# Patient Record
Sex: MALE | Race: WHITE | NOT HISPANIC OR LATINO | Employment: OTHER | URBAN - NONMETROPOLITAN AREA
[De-identification: names, ages, dates, MRNs, and addresses within clinical notes are randomized per-mention and may not be internally consistent; named-entity substitution may affect disease eponyms.]

---

## 2023-08-30 PROBLEM — I25.10 ASCVD (ARTERIOSCLEROTIC CARDIOVASCULAR DISEASE): Status: ACTIVE | Noted: 2023-08-30

## 2023-08-30 PROBLEM — F41.9 ANXIETY DISORDER: Status: ACTIVE | Noted: 2023-08-30

## 2023-08-30 PROBLEM — E11.9 DIABETES MELLITUS (MULTI): Status: ACTIVE | Noted: 2023-08-30

## 2023-08-30 PROBLEM — E66.3 OVERWEIGHT WITH BODY MASS INDEX (BMI) OF 29 TO 29.9 IN ADULT: Status: ACTIVE | Noted: 2023-08-30

## 2023-08-30 PROBLEM — I10 ESSENTIAL HYPERTENSION: Status: ACTIVE | Noted: 2023-08-30

## 2023-08-30 PROBLEM — M05.20 RHEUMATOID ARTERITIS (MULTI): Status: ACTIVE | Noted: 2023-08-30

## 2023-08-30 PROBLEM — Z95.1 S/P CABG (CORONARY ARTERY BYPASS GRAFT): Status: ACTIVE | Noted: 2023-08-30

## 2023-08-30 PROBLEM — E78.5 HYPERLIPIDEMIA: Status: ACTIVE | Noted: 2023-08-30

## 2023-08-30 RX ORDER — FOLIC ACID 1 MG/1
1 TABLET ORAL DAILY
COMMUNITY

## 2023-08-30 RX ORDER — METFORMIN HYDROCHLORIDE 500 MG/1
500 TABLET ORAL
COMMUNITY

## 2023-08-30 RX ORDER — CHOLECALCIFEROL (VITAMIN D3) 125 MCG
2000 TABLET ORAL
COMMUNITY
End: 2023-10-06

## 2023-08-30 RX ORDER — ASPIRIN 81 MG/1
1 TABLET ORAL DAILY
COMMUNITY

## 2023-08-30 RX ORDER — PREDNISONE 5 MG/1
1 TABLET ORAL DAILY
COMMUNITY
End: 2023-10-06

## 2023-08-30 RX ORDER — METHOTREXATE 2.5 MG/1
8 TABLET ORAL
COMMUNITY

## 2023-08-30 RX ORDER — LANOLIN ALCOHOL/MO/W.PET/CERES
1 CREAM (GRAM) TOPICAL DAILY
COMMUNITY

## 2023-08-30 RX ORDER — BISOPROLOL FUMARATE 5 MG/1
1 TABLET, FILM COATED ORAL DAILY
COMMUNITY

## 2023-08-30 RX ORDER — LISINOPRIL 40 MG/1
1 TABLET ORAL DAILY
COMMUNITY

## 2023-08-30 RX ORDER — AMLODIPINE BESYLATE 10 MG/1
1 TABLET ORAL DAILY
COMMUNITY

## 2023-08-30 RX ORDER — NITROGLYCERIN 0.4 MG/1
TABLET SUBLINGUAL
COMMUNITY

## 2023-08-30 RX ORDER — EMPAGLIFLOZIN 10 MG/1
1 TABLET, FILM COATED ORAL DAILY
COMMUNITY

## 2023-08-30 RX ORDER — VENLAFAXINE 75 MG/1
1 TABLET ORAL 2 TIMES DAILY
COMMUNITY

## 2023-08-30 RX ORDER — ATORVASTATIN CALCIUM 40 MG/1
1 TABLET, FILM COATED ORAL DAILY
COMMUNITY

## 2023-10-06 ENCOUNTER — OFFICE VISIT (OUTPATIENT)
Dept: CARDIOLOGY | Facility: CLINIC | Age: 78
End: 2023-10-06
Payer: MEDICARE

## 2023-10-06 VITALS
WEIGHT: 218 LBS | HEIGHT: 72 IN | DIASTOLIC BLOOD PRESSURE: 80 MMHG | SYSTOLIC BLOOD PRESSURE: 138 MMHG | BODY MASS INDEX: 29.53 KG/M2 | HEART RATE: 62 BPM

## 2023-10-06 DIAGNOSIS — E66.3 OVERWEIGHT WITH BODY MASS INDEX (BMI) OF 29 TO 29.9 IN ADULT: ICD-10-CM

## 2023-10-06 DIAGNOSIS — F41.1 GENERALIZED ANXIETY DISORDER: ICD-10-CM

## 2023-10-06 DIAGNOSIS — M05.20 RHEUMATOID ARTERITIS (MULTI): ICD-10-CM

## 2023-10-06 DIAGNOSIS — I25.10 ASCVD (ARTERIOSCLEROTIC CARDIOVASCULAR DISEASE): Primary | ICD-10-CM

## 2023-10-06 DIAGNOSIS — I10 ESSENTIAL HYPERTENSION: ICD-10-CM

## 2023-10-06 DIAGNOSIS — Z95.1 S/P CABG (CORONARY ARTERY BYPASS GRAFT): ICD-10-CM

## 2023-10-06 DIAGNOSIS — E78.5 HYPERLIPIDEMIA, UNSPECIFIED HYPERLIPIDEMIA TYPE: ICD-10-CM

## 2023-10-06 PROCEDURE — 99214 OFFICE O/P EST MOD 30 MIN: CPT | Performed by: INTERNAL MEDICINE

## 2023-10-06 PROCEDURE — 3075F SYST BP GE 130 - 139MM HG: CPT | Performed by: INTERNAL MEDICINE

## 2023-10-06 PROCEDURE — 1159F MED LIST DOCD IN RCRD: CPT | Performed by: INTERNAL MEDICINE

## 2023-10-06 PROCEDURE — 3079F DIAST BP 80-89 MM HG: CPT | Performed by: INTERNAL MEDICINE

## 2023-10-06 PROCEDURE — 1036F TOBACCO NON-USER: CPT | Performed by: INTERNAL MEDICINE

## 2023-10-06 ASSESSMENT — PATIENT HEALTH QUESTIONNAIRE - PHQ9
1. LITTLE INTEREST OR PLEASURE IN DOING THINGS: NOT AT ALL
2. FEELING DOWN, DEPRESSED OR HOPELESS: NOT AT ALL
SUM OF ALL RESPONSES TO PHQ9 QUESTIONS 1 AND 2: 0

## 2023-10-06 NOTE — PROGRESS NOTES
Subjective   Viral Reynoso is a 78 y.o. male       Chief Complaint    Annual Exam          HPI     Patient is here for follow-up to management for coronary artery disease with remote bypass surgery, hypertension, hyperlipidemia and obesity.  Since last time I saw him he reports functional class I.  He reports he developed rheumatoid arthritis.  He denies complaint of chest pain, palpitation, lightheadedness, dizziness or syncope he denies any cardiac complaint.    ASSESSMENT:      1. Coronary artery disease with remote bypass surgery in 2004. His last stress test not too long ago was negative. Patient remains asymptomatic and functional class I. His last stress test was back in 2018  2. Hypertension. Controlled  3. Diabetes mellitus appears to be reasonably controlled per his description  4. Hyperlipidemia. No recent labs available to me  5. Obesity. No significant weight changes  7. Recent diagnosis of rheumatoid arthritis  6. History of anxiety     Plan     1. The patient was advised to continue present medical therapy  2. The patient was counseled on losing weight, exercise and dietary modification  3. I will try to retrieve his recent lipid profile from the hospital and I did review the rest of his lab with him  4. I Patient was advised to notify us with any change in cardiac status or symptom  5. I will see him in 8 months  6. I advised him to notify me change in cardiac status or symptoms       Review of Systems   All other systems reviewed and are negative.               Objective   Physical Exam  Constitutional:       Appearance: Normal appearance. He is normal weight.   HENT:      Nose: Nose normal.   Neck:      Vascular: No carotid bruit.   Cardiovascular:      Rate and Rhythm: Normal rate.      Pulses: Normal pulses.      Heart sounds: Normal heart sounds.   Pulmonary:      Effort: Pulmonary effort is normal.   Abdominal:      General: Bowel sounds are normal.      Palpations: Abdomen is soft.    Genitourinary:     Rectum: Normal.   Musculoskeletal:         General: Normal range of motion.      Cervical back: Normal range of motion.      Right lower leg: No edema.      Left lower leg: No edema.   Skin:     General: Skin is warm and dry.   Neurological:      General: No focal deficit present.      Mental Status: He is alert.   Psychiatric:         Mood and Affect: Mood normal.         Behavior: Behavior normal.         Thought Content: Thought content normal.         Judgment: Judgment normal.       Visit Vitals  /80 (BP Location: Right arm, Patient Position: Sitting)   Pulse 62   Ht 1.829 m (6')   Wt 98.9 kg (218 lb)   BMI 29.57 kg/m²   Smoking Status Former   BSA 2.24 m²                 Assessment/Plan   There are no diagnoses linked to this encounter.   1. ASCVD (arteriosclerotic cardiovascular disease)        2. Essential hypertension        3. Hyperlipidemia, unspecified hyperlipidemia type        4. S/P CABG (coronary artery bypass graft)        5. Overweight with body mass index (BMI) of 29 to 29.9 in adult        6. Rheumatoid arteritis (CMS/HCC)        7. Generalized anxiety disorder

## 2024-08-09 ENCOUNTER — APPOINTMENT (OUTPATIENT)
Dept: CARDIOLOGY | Facility: CLINIC | Age: 79
End: 2024-08-09
Payer: MEDICARE

## 2024-09-09 ENCOUNTER — APPOINTMENT (OUTPATIENT)
Dept: CARDIOLOGY | Facility: CLINIC | Age: 79
End: 2024-09-09
Payer: MEDICARE

## 2024-09-09 VITALS
SYSTOLIC BLOOD PRESSURE: 122 MMHG | BODY MASS INDEX: 27.82 KG/M2 | HEART RATE: 60 BPM | WEIGHT: 205.4 LBS | HEIGHT: 72 IN | DIASTOLIC BLOOD PRESSURE: 64 MMHG

## 2024-09-09 DIAGNOSIS — I10 ESSENTIAL HYPERTENSION: ICD-10-CM

## 2024-09-09 DIAGNOSIS — F41.1 GENERALIZED ANXIETY DISORDER: ICD-10-CM

## 2024-09-09 DIAGNOSIS — E78.5 HYPERLIPIDEMIA, UNSPECIFIED HYPERLIPIDEMIA TYPE: ICD-10-CM

## 2024-09-09 DIAGNOSIS — Z87.891 FORMER SMOKER: ICD-10-CM

## 2024-09-09 DIAGNOSIS — M05.20 RHEUMATOID ARTERITIS (MULTI): ICD-10-CM

## 2024-09-09 DIAGNOSIS — I25.10 ASCVD (ARTERIOSCLEROTIC CARDIOVASCULAR DISEASE): Primary | ICD-10-CM

## 2024-09-09 DIAGNOSIS — Z95.1 S/P CABG (CORONARY ARTERY BYPASS GRAFT): ICD-10-CM

## 2024-09-09 PROBLEM — E66.3 OVERWEIGHT WITH BODY MASS INDEX (BMI) OF 29 TO 29.9 IN ADULT: Status: RESOLVED | Noted: 2023-08-30 | Resolved: 2024-09-09

## 2024-09-09 PROCEDURE — 99214 OFFICE O/P EST MOD 30 MIN: CPT | Performed by: INTERNAL MEDICINE

## 2024-09-09 PROCEDURE — 1160F RVW MEDS BY RX/DR IN RCRD: CPT | Performed by: INTERNAL MEDICINE

## 2024-09-09 PROCEDURE — 1159F MED LIST DOCD IN RCRD: CPT | Performed by: INTERNAL MEDICINE

## 2024-09-09 PROCEDURE — 1036F TOBACCO NON-USER: CPT | Performed by: INTERNAL MEDICINE

## 2024-09-09 PROCEDURE — 3078F DIAST BP <80 MM HG: CPT | Performed by: INTERNAL MEDICINE

## 2024-09-09 PROCEDURE — 3074F SYST BP LT 130 MM HG: CPT | Performed by: INTERNAL MEDICINE

## 2024-09-09 NOTE — PATIENT INSTRUCTIONS
Please bring all medicines, vitamins, and herbal supplements with you when you come to the office.    Prescriptions will not be filled unless you are compliant with your follow up appointments or have a follow up appointment scheduled as per instruction of your physician. Refills should be requested at the time of your visit.     BMI was above normal measurement. Current weight: 93.2 kg (205 lb 6.4 oz)  Weight change since last visit (-) denotes wt loss -12.6 lbs   Weight loss needed to achieve BMI 25: 21.5 Lbs  Weight loss needed to achieve BMI 30: -15.3 Lbs  Provided instructions on dietary changes.

## 2024-09-09 NOTE — PROGRESS NOTES
Subjective   Viral Reynoso is a 79 y.o. male       Chief Complaint    Follow-up          HPI       Patient is here for follow-up continue management for coronary artery disease with remote bypass surgery, hypertension and hyperlipidemia.  Since last time I saw him he reports he is feeling well.  He denies any complaint of chest pain, palpitation, lightheadedness, dizziness or syncope.  He remains active.  Described functional class I.  His recent lab noted and reviewed with him.  However no lipid profile is available.    ASSESSMENT:      1. Coronary artery disease with remote bypass surgery in 2004. His last stress test not too long ago was negative. Patient remains asymptomatic and functional class I. His last stress test was back in 2018.  Remain active and functional class I  2. Hypertension. Controlled  3. Diabetes mellitus appears to be reasonably controlled per his description  4. Hyperlipidemia. No recent labs available to me  5. Obesity. No significant weight changes  7. Recent diagnosis of rheumatoid arthritis  6. History of anxiety     Plan     1. The patient was advised to continue present medical therapy  2. The patient was counseled on losing weight, exercise and dietary modification  3. I  advised him to have a lipid profile  4. I Patient was advised to notify us with any change in cardiac status or symptom  5. I will see him in  9 months  6. I advised him to notify me change in cardiac status or symptoms  Review of Systems   All other systems reviewed and are negative.           Vitals:    09/09/24 0931   BP: 122/64   BP Location: Left arm   Patient Position: Sitting   Pulse: 60   Weight: 93.2 kg (205 lb 6.4 oz)   Height: 1.829 m (6')        Objective   Physical Exam  Constitutional:       Appearance: Normal appearance.   HENT:      Nose: Nose normal.   Neck:      Vascular: No carotid bruit.   Cardiovascular:      Rate and Rhythm: Normal rate.      Pulses: Normal pulses.      Heart sounds: Normal heart  sounds.   Pulmonary:      Effort: Pulmonary effort is normal.   Abdominal:      General: Bowel sounds are normal.      Palpations: Abdomen is soft.   Musculoskeletal:         General: Normal range of motion.      Cervical back: Normal range of motion.      Right lower leg: No edema.      Left lower leg: No edema.   Skin:     General: Skin is warm and dry.   Neurological:      General: No focal deficit present.      Mental Status: He is alert.   Psychiatric:         Mood and Affect: Mood normal.         Behavior: Behavior normal.         Thought Content: Thought content normal.         Judgment: Judgment normal.         Allergies  Hydrochlorothiazide     Current Medications    Current Outpatient Medications:     amLODIPine (Norvasc) 10 mg tablet, Take 1 tablet (10 mg) by mouth once daily., Disp: , Rfl:     aspirin 81 mg EC tablet, Take 1 tablet (81 mg) by mouth once daily., Disp: , Rfl:     atorvastatin (Lipitor) 40 mg tablet, Take 1 tablet (40 mg) by mouth once daily., Disp: , Rfl:     bisoprolol (Zebeta) 5 mg tablet, Take 1 tablet (5 mg) by mouth once daily., Disp: , Rfl:     cyanocobalamin (Vitamin B-12) 1,000 mcg tablet, Take 1 tablet (1,000 mcg) by mouth once daily., Disp: , Rfl:     folic acid (Folvite) 1 mg tablet, Take 1 tablet (1 mg) by mouth once daily., Disp: , Rfl:     golimumab (Simponi Aria) 12.5 mg/mL solution injection, Infuse 2 mg/kg into a venous catheter every 8 (eight) weeks.  every 8 weeks, Disp: , Rfl:     Jardiance 10 mg, Take 1 tablet (10 mg) by mouth once daily., Disp: , Rfl:     lisinopril 40 mg tablet, Take 1 tablet (40 mg) by mouth once daily., Disp: , Rfl:     metFORMIN (Glucophage) 500 mg tablet, Take 2 tablets (1,000 mg) by mouth 2 times daily (morning and late afternoon).  TAKE 2 TABLETS IN THE MORNING AND 2 TABLETs IN THE EVENING., Disp: , Rfl:     methotrexate (Trexall) 2.5 mg tablet, Take 10 tablets (25 mg total) by mouth 1 (one) time per week., Disp: , Rfl:     nitroglycerin  (Nitrostat) 0.4 mg SL tablet, Place under the tongue.  PLACE 1 TABLET UNDER THE TONGUE EVERY 5 MINUTES FOR UP TO 3 DOSES AS NEEDED FOR CHEST PAIN.CALL 911 IF PAIN PERSISTS., Disp: , Rfl:     venlafaxine (Effexor) 75 mg tablet, Take 1 tablet (75 mg) by mouth 2 times a day., Disp: , Rfl:                      Assessment/Plan   1. ASCVD (arteriosclerotic cardiovascular disease)  Follow Up In Cardiology    Follow Up In Cardiology      2. Essential hypertension  Follow Up In Cardiology      3. Hyperlipidemia, unspecified hyperlipidemia type  Lipid Panel    Lipid Panel      4. S/P CABG (coronary artery bypass graft)        5. BMI 27.0-27.9,adult        6. Rheumatoid arteritis (Multi)        7. Generalized anxiety disorder        8. Former smoker                 Scribe Attestation  By signing my name below, ITania LPN  , Scribe   attest that this documentation has been prepared under the direction and in the presence of Baldo Eldridge MD.     Provider Attestation - Scribe documentation    All medical record entries made by the Scribe were at my direction and personally dictated by me. I have reviewed the chart and agree that the record accurately reflects my personal performance of the history, physical exam, discussion and plan.

## 2024-09-09 NOTE — LETTER
September 9, 2024     Bhargavi Batres DO  [address] [facility] [address]    Patient: Viral Reynoso   YOB: 1945   Date of Visit: 9/9/2024       Dear Dr. Bhargavi Batres, DO:    Thank you for referring Viral Reynoso to me for evaluation. Below are my notes for this consultation.  If you have questions, please do not hesitate to call me. I look forward to following your patient along with you.       Sincerely,     Baldo Eldridge MD      CC: No Recipients  ______________________________________________________________________________________    Subjective   Viral Reynoso is a 79 y.o. male       Chief Complaint    Follow-up          HPI       Patient is here for follow-up continue management for coronary artery disease with remote bypass surgery, hypertension and hyperlipidemia.  Since last time I saw him he reports he is feeling well.  He denies any complaint of chest pain, palpitation, lightheadedness, dizziness or syncope.  He remains active.  Described functional class I.  His recent lab noted and reviewed with him.  However no lipid profile is available.    ASSESSMENT:      1. Coronary artery disease with remote bypass surgery in 2004. His last stress test not too long ago was negative. Patient remains asymptomatic and functional class I. His last stress test was back in 2018.  Remain active and functional class I  2. Hypertension. Controlled  3. Diabetes mellitus appears to be reasonably controlled per his description  4. Hyperlipidemia. No recent labs available to me  5. Obesity. No significant weight changes  7. Recent diagnosis of rheumatoid arthritis  6. History of anxiety     Plan     1. The patient was advised to continue present medical therapy  2. The patient was counseled on losing weight, exercise and dietary modification  3. I  advised him to have a lipid profile  4. I Patient was advised to notify us with any change in cardiac status or symptom  5. I will see him in   9 months  6. I advised him to notify me change in cardiac status or symptoms  Review of Systems   All other systems reviewed and are negative.           Vitals:    09/09/24 0931   BP: 122/64   BP Location: Left arm   Patient Position: Sitting   Pulse: 60   Weight: 93.2 kg (205 lb 6.4 oz)   Height: 1.829 m (6')        Objective   Physical Exam  Constitutional:       Appearance: Normal appearance.   HENT:      Nose: Nose normal.   Neck:      Vascular: No carotid bruit.   Cardiovascular:      Rate and Rhythm: Normal rate.      Pulses: Normal pulses.      Heart sounds: Normal heart sounds.   Pulmonary:      Effort: Pulmonary effort is normal.   Abdominal:      General: Bowel sounds are normal.      Palpations: Abdomen is soft.   Musculoskeletal:         General: Normal range of motion.      Cervical back: Normal range of motion.      Right lower leg: No edema.      Left lower leg: No edema.   Skin:     General: Skin is warm and dry.   Neurological:      General: No focal deficit present.      Mental Status: He is alert.   Psychiatric:         Mood and Affect: Mood normal.         Behavior: Behavior normal.         Thought Content: Thought content normal.         Judgment: Judgment normal.         Allergies  Hydrochlorothiazide     Current Medications    Current Outpatient Medications:   •  amLODIPine (Norvasc) 10 mg tablet, Take 1 tablet (10 mg) by mouth once daily., Disp: , Rfl:   •  aspirin 81 mg EC tablet, Take 1 tablet (81 mg) by mouth once daily., Disp: , Rfl:   •  atorvastatin (Lipitor) 40 mg tablet, Take 1 tablet (40 mg) by mouth once daily., Disp: , Rfl:   •  bisoprolol (Zebeta) 5 mg tablet, Take 1 tablet (5 mg) by mouth once daily., Disp: , Rfl:   •  cyanocobalamin (Vitamin B-12) 1,000 mcg tablet, Take 1 tablet (1,000 mcg) by mouth once daily., Disp: , Rfl:   •  folic acid (Folvite) 1 mg tablet, Take 1 tablet (1 mg) by mouth once daily., Disp: , Rfl:   •  golimumab (Simponi Aria) 12.5 mg/mL solution injection,  Infuse 2 mg/kg into a venous catheter every 8 (eight) weeks.  every 8 weeks, Disp: , Rfl:   •  Jardiance 10 mg, Take 1 tablet (10 mg) by mouth once daily., Disp: , Rfl:   •  lisinopril 40 mg tablet, Take 1 tablet (40 mg) by mouth once daily., Disp: , Rfl:   •  metFORMIN (Glucophage) 500 mg tablet, Take 2 tablets (1,000 mg) by mouth 2 times daily (morning and late afternoon).  TAKE 2 TABLETS IN THE MORNING AND 2 TABLETs IN THE EVENING., Disp: , Rfl:   •  methotrexate (Trexall) 2.5 mg tablet, Take 10 tablets (25 mg total) by mouth 1 (one) time per week., Disp: , Rfl:   •  nitroglycerin (Nitrostat) 0.4 mg SL tablet, Place under the tongue.  PLACE 1 TABLET UNDER THE TONGUE EVERY 5 MINUTES FOR UP TO 3 DOSES AS NEEDED FOR CHEST PAIN.CALL 911 IF PAIN PERSISTS., Disp: , Rfl:   •  venlafaxine (Effexor) 75 mg tablet, Take 1 tablet (75 mg) by mouth 2 times a day., Disp: , Rfl:                      Assessment/Plan   1. ASCVD (arteriosclerotic cardiovascular disease)  Follow Up In Cardiology    Follow Up In Cardiology      2. Essential hypertension  Follow Up In Cardiology      3. Hyperlipidemia, unspecified hyperlipidemia type  Lipid Panel    Lipid Panel      4. S/P CABG (coronary artery bypass graft)        5. BMI 27.0-27.9,adult        6. Rheumatoid arteritis (Multi)        7. Generalized anxiety disorder        8. Former smoker                 Scribe Attestation  By signing my name below, I, Rhonda Glass LPN   attest that this documentation has been prepared under the direction and in the presence of Baldo Eldridge MD.     Provider Attestation - Scribe documentation    All medical record entries made by the Scribe were at my direction and personally dictated by me. I have reviewed the chart and agree that the record accurately reflects my personal performance of the history, physical exam, discussion and plan.

## 2024-09-25 LAB
NON-UH HIE CHOL/HDL RATIO: 2.6
NON-UH HIE CHOLESTEROL: 124 MG/DL (ref 140–200)
NON-UH HIE HDL CHOLESTEROL: 47 MG/DL (ref 23–92)
NON-UH HIE LDL CHOLESTEROL,CALCULATED: 55 MG/DL (ref 0–100)
NON-UH HIE TRIGLYCERIDE W/REFLEX: 112 MG/DL (ref 0–149)
NON-UH HIE VLDL CHOLESTEROL: 22 MG/DL

## 2025-06-09 ENCOUNTER — APPOINTMENT (OUTPATIENT)
Dept: CARDIOLOGY | Facility: CLINIC | Age: 80
End: 2025-06-09
Payer: MEDICARE

## 2025-06-09 VITALS
HEIGHT: 72 IN | WEIGHT: 211.4 LBS | BODY MASS INDEX: 28.63 KG/M2 | HEART RATE: 56 BPM | DIASTOLIC BLOOD PRESSURE: 76 MMHG | SYSTOLIC BLOOD PRESSURE: 132 MMHG

## 2025-06-09 DIAGNOSIS — Z87.891 FORMER SMOKER: ICD-10-CM

## 2025-06-09 DIAGNOSIS — I10 ESSENTIAL HYPERTENSION: ICD-10-CM

## 2025-06-09 DIAGNOSIS — Z95.1 S/P CABG (CORONARY ARTERY BYPASS GRAFT): Primary | ICD-10-CM

## 2025-06-09 DIAGNOSIS — I25.10 ASCVD (ARTERIOSCLEROTIC CARDIOVASCULAR DISEASE): ICD-10-CM

## 2025-06-09 DIAGNOSIS — E78.5 HYPERLIPIDEMIA, UNSPECIFIED HYPERLIPIDEMIA TYPE: ICD-10-CM

## 2025-06-09 PROCEDURE — 1036F TOBACCO NON-USER: CPT | Performed by: INTERNAL MEDICINE

## 2025-06-09 PROCEDURE — 99214 OFFICE O/P EST MOD 30 MIN: CPT | Performed by: INTERNAL MEDICINE

## 2025-06-09 PROCEDURE — 1160F RVW MEDS BY RX/DR IN RCRD: CPT | Performed by: INTERNAL MEDICINE

## 2025-06-09 PROCEDURE — 3078F DIAST BP <80 MM HG: CPT | Performed by: INTERNAL MEDICINE

## 2025-06-09 PROCEDURE — G2211 COMPLEX E/M VISIT ADD ON: HCPCS | Performed by: INTERNAL MEDICINE

## 2025-06-09 PROCEDURE — 1159F MED LIST DOCD IN RCRD: CPT | Performed by: INTERNAL MEDICINE

## 2025-06-09 PROCEDURE — 3075F SYST BP GE 130 - 139MM HG: CPT | Performed by: INTERNAL MEDICINE

## 2025-06-09 RX ORDER — CHOLECALCIFEROL (VITAMIN D3) 25 MCG
25 TABLET ORAL DAILY
COMMUNITY

## 2025-06-09 NOTE — LETTER
June 9, 2025     Bhargavi Batres DO  [address] [facility] [address]    Patient: Viral Reynoso   YOB: 1945   Date of Visit: 6/9/2025       Dear Dr. Bhargavi Batres, DO:    Thank you for referring Viral Reynoso to me for evaluation. Below are my notes for this consultation.  If you have questions, please do not hesitate to call me. I look forward to following your patient along with you.       Sincerely,     Baldo Eldridge MD      CC: No Recipients  ______________________________________________________________________________________    Chief Complaint   Patient presents with   • Follow-up     Patient here for 9 month follow up for arteriosclerotic cardiovascular disease, denies cardiac c/o at this time.        Subjective   Viral Reynoso is a 79 y.o. male     HPI        Patient is here for follow-up continue management for history of coronary artery disease with remote bypass surgery, hypertension, hyperlipidemia and obesity.  Since last time I saw him he denies any change in cardiac status or symptoms.  He remains reasonably active.  He denies chest pain, palpitation, lightheadedness, dizziness or syncope.  He remains active.    ASSESSMENT:      1. Coronary artery disease with remote bypass surgery in 2004 that included LIMA to the LAD, saphenous vein graft to diagonal, obtuse marginal and PDA. Patient remains asymptomatic and functional class I. His last stress test was back in 2018.  Remain active and functional class I  2. Hypertension. Controlled with bisoprolol and lisinopril  3. Diabetes mellitus appears to be reasonably controlled per his description  4. Hyperlipidemia.  Controlled on atorvastatin 40 mg daily  5. Obesity. No significant weight changes BMI 28  7. Rheumatoid arthritis on methotrexate  6. History of anxiety     Plan     1. The patient was advised to continue present medical therapy  2. The patient was counseled on losing weight, exercise and dietary  modification  3. I reviewed his lipid profile and lab work  4. I Patient was advised to notify us with any change in cardiac status or symptom  5. I will see him in  9 months  6. I advised him to notify me change in cardiac status or symptoms  Review of Systems   All other systems reviewed and are negative.           Vitals:    06/09/25 1033 06/09/25 1039   BP: (!) 152/96 132/76   BP Location: Left arm Right arm   Patient Position: Sitting Sitting   Pulse:  56   Weight: 95.9 kg (211 lb 6.4 oz)    Height: 1.829 m (6')         Objective   Physical Exam  Constitutional:       Appearance: Normal appearance.   HENT:      Nose: Nose normal.   Neck:      Vascular: No carotid bruit.   Cardiovascular:      Rate and Rhythm: Normal rate.      Pulses: Normal pulses.      Heart sounds: Normal heart sounds.   Pulmonary:      Effort: Pulmonary effort is normal.   Abdominal:      General: Bowel sounds are normal.      Palpations: Abdomen is soft.   Musculoskeletal:         General: Normal range of motion.      Cervical back: Normal range of motion.      Right lower leg: No edema.      Left lower leg: No edema.   Skin:     General: Skin is warm and dry.   Neurological:      General: No focal deficit present.      Mental Status: He is alert.   Psychiatric:         Mood and Affect: Mood normal.         Behavior: Behavior normal.         Thought Content: Thought content normal.         Judgment: Judgment normal.         Allergies  Hydrochlorothiazide     Current Medications  Current Outpatient Medications   Medication Instructions   • amLODIPine (Norvasc) 10 mg tablet 1 tablet, Daily   • aspirin 81 mg EC tablet 1 tablet, Daily   • atorvastatin (Lipitor) 40 mg tablet 1 tablet, Daily   • bisoprolol (Zebeta) 5 mg tablet 1 tablet, Daily   • cholecalciferol (VITAMIN D3) 25 mcg, Daily   • cyanocobalamin (Vitamin B-12) 1,000 mcg tablet 1 tablet, Daily   • folic acid (Folvite) 1 mg tablet 1 tablet, Daily   • golimumab (Simponi Aria) 12.5 mg/mL  solution injection 2 mg/kg, Every 8 weeks   • Jardiance 10 mg 1 tablet, Daily   • lisinopril 40 mg tablet 1 tablet, Daily   • metFORMIN (GLUCOPHAGE) 1,000 mg, 2 times daily (morning and late afternoon)   • methotrexate (Trexall) 2.5 mg tablet 10 tablets, Once Weekly   • nitroglycerin (Nitrostat) 0.4 mg SL tablet Place under the tongue.  PLACE 1 TABLET UNDER THE TONGUE EVERY 5 MINUTES FOR UP TO 3 DOSES AS NEEDED FOR CHEST PAIN.CALL 911 IF PAIN PERSISTS.   • venlafaxine (Effexor) 75 mg tablet 1 tablet, 2 times daily                        Assessment/Plan   1. S/P CABG (coronary artery bypass graft)        2. ASCVD (arteriosclerotic cardiovascular disease)  Follow Up In Cardiology    Follow Up In Cardiology      3. Hyperlipidemia, unspecified hyperlipidemia type        4. Essential hypertension        5. BMI 28.0-28.9,adult        6. Former smoker                 Scribe Attestation  By signing my name below, I, Tania MERCADO LPN Scribe   attest that this documentation has been prepared under the direction and in the presence of aBldo Eldridge MD.     Provider Attestation - Scribe documentation    All medical record entries made by the Scribe were at my direction and personally dictated by me. I have reviewed the chart and agree that the record accurately reflects my personal performance of the history, physical exam, discussion and plan.

## 2025-06-09 NOTE — PROGRESS NOTES
Chief Complaint   Patient presents with    Follow-up     Patient here for 9 month follow up for arteriosclerotic cardiovascular disease, denies cardiac c/o at this time.        Subjective   Viral Reynoso is a 79 y.o. male     HPI        Patient is here for follow-up continue management for history of coronary artery disease with remote bypass surgery, hypertension, hyperlipidemia and obesity.  Since last time I saw him he denies any change in cardiac status or symptoms.  He remains reasonably active.  He denies chest pain, palpitation, lightheadedness, dizziness or syncope.  He remains active.    ASSESSMENT:      1. Coronary artery disease with remote bypass surgery in 2004 that included LIMA to the LAD, saphenous vein graft to diagonal, obtuse marginal and PDA. Patient remains asymptomatic and functional class I. His last stress test was back in 2018.  Remain active and functional class I  2. Hypertension. Controlled with bisoprolol and lisinopril  3. Diabetes mellitus appears to be reasonably controlled per his description  4. Hyperlipidemia.  Controlled on atorvastatin 40 mg daily  5. Obesity. No significant weight changes BMI 28  7. Rheumatoid arthritis on methotrexate  6. History of anxiety     Plan     1. The patient was advised to continue present medical therapy  2. The patient was counseled on losing weight, exercise and dietary modification  3. I reviewed his lipid profile and lab work  4. I Patient was advised to notify us with any change in cardiac status or symptom  5. I will see him in  9 months  6. I advised him to notify me change in cardiac status or symptoms  Review of Systems   All other systems reviewed and are negative.           Vitals:    06/09/25 1033 06/09/25 1039   BP: (!) 152/96 132/76   BP Location: Left arm Right arm   Patient Position: Sitting Sitting   Pulse:  56   Weight: 95.9 kg (211 lb 6.4 oz)    Height: 1.829 m (6')         Objective   Physical Exam  Constitutional:        Appearance: Normal appearance.   HENT:      Nose: Nose normal.   Neck:      Vascular: No carotid bruit.   Cardiovascular:      Rate and Rhythm: Normal rate.      Pulses: Normal pulses.      Heart sounds: Normal heart sounds.   Pulmonary:      Effort: Pulmonary effort is normal.   Abdominal:      General: Bowel sounds are normal.      Palpations: Abdomen is soft.   Musculoskeletal:         General: Normal range of motion.      Cervical back: Normal range of motion.      Right lower leg: No edema.      Left lower leg: No edema.   Skin:     General: Skin is warm and dry.   Neurological:      General: No focal deficit present.      Mental Status: He is alert.   Psychiatric:         Mood and Affect: Mood normal.         Behavior: Behavior normal.         Thought Content: Thought content normal.         Judgment: Judgment normal.         Allergies  Hydrochlorothiazide     Current Medications  Current Outpatient Medications   Medication Instructions    amLODIPine (Norvasc) 10 mg tablet 1 tablet, Daily    aspirin 81 mg EC tablet 1 tablet, Daily    atorvastatin (Lipitor) 40 mg tablet 1 tablet, Daily    bisoprolol (Zebeta) 5 mg tablet 1 tablet, Daily    cholecalciferol (VITAMIN D3) 25 mcg, Daily    cyanocobalamin (Vitamin B-12) 1,000 mcg tablet 1 tablet, Daily    folic acid (Folvite) 1 mg tablet 1 tablet, Daily    golimumab (Simponi Aria) 12.5 mg/mL solution injection 2 mg/kg, Every 8 weeks    Jardiance 10 mg 1 tablet, Daily    lisinopril 40 mg tablet 1 tablet, Daily    metFORMIN (GLUCOPHAGE) 1,000 mg, 2 times daily (morning and late afternoon)    methotrexate (Trexall) 2.5 mg tablet 10 tablets, Once Weekly    nitroglycerin (Nitrostat) 0.4 mg SL tablet Place under the tongue.  PLACE 1 TABLET UNDER THE TONGUE EVERY 5 MINUTES FOR UP TO 3 DOSES AS NEEDED FOR CHEST PAIN.CALL 911 IF PAIN PERSISTS.    venlafaxine (Effexor) 75 mg tablet 1 tablet, 2 times daily                        Assessment/Plan   1. S/P CABG (coronary artery  bypass graft)        2. ASCVD (arteriosclerotic cardiovascular disease)  Follow Up In Cardiology    Follow Up In Cardiology      3. Hyperlipidemia, unspecified hyperlipidemia type        4. Essential hypertension        5. BMI 28.0-28.9,adult        6. Former smoker                 Scribe Attestation  By signing my name below, ITania LPN  , Scribe   attest that this documentation has been prepared under the direction and in the presence of Baldo Eldridge MD.     Provider Attestation - Scribe documentation    All medical record entries made by the Scribe were at my direction and personally dictated by me. I have reviewed the chart and agree that the record accurately reflects my personal performance of the history, physical exam, discussion and plan.

## 2025-06-09 NOTE — PATIENT INSTRUCTIONS
Please bring all medicines, vitamins, and herbal supplements with you when you come to the office.    Prescriptions will not be filled unless you are compliant with your follow up appointments or have a follow up appointment scheduled as per instruction of your physician. Refills should be requested at the time of your visit.     BMI was above normal measurement. Current weight: 95.9 kg (211 lb 6.4 oz)  Weight change since last visit (-) denotes wt loss 6 lbs   Weight loss needed to achieve BMI 25: 27.5 Lbs  Weight loss needed to achieve BMI 30: -9.3 Lbs  Provided instructions on dietary changes  Provided instructions on exercise.

## 2026-03-27 ENCOUNTER — APPOINTMENT (OUTPATIENT)
Dept: CARDIOLOGY | Facility: CLINIC | Age: 81
End: 2026-03-27
Payer: MEDICARE